# Patient Record
Sex: FEMALE | Race: WHITE | NOT HISPANIC OR LATINO
[De-identification: names, ages, dates, MRNs, and addresses within clinical notes are randomized per-mention and may not be internally consistent; named-entity substitution may affect disease eponyms.]

---

## 2018-06-06 ENCOUNTER — APPOINTMENT (OUTPATIENT)
Dept: THORACIC SURGERY | Facility: CLINIC | Age: 78
End: 2018-06-06
Payer: MEDICARE

## 2018-06-06 VITALS
SYSTOLIC BLOOD PRESSURE: 154 MMHG | HEIGHT: 63 IN | WEIGHT: 145 LBS | HEART RATE: 66 BPM | BODY MASS INDEX: 25.69 KG/M2 | RESPIRATION RATE: 16 BRPM | DIASTOLIC BLOOD PRESSURE: 74 MMHG | OXYGEN SATURATION: 99 %

## 2018-06-06 DIAGNOSIS — R91.1 SOLITARY PULMONARY NODULE: ICD-10-CM

## 2018-06-06 PROCEDURE — 99205 OFFICE O/P NEW HI 60 MIN: CPT

## 2019-06-27 ENCOUNTER — APPOINTMENT (OUTPATIENT)
Dept: VASCULAR SURGERY | Facility: CLINIC | Age: 79
End: 2019-06-27
Payer: MEDICARE

## 2019-06-27 PROCEDURE — 93970 EXTREMITY STUDY: CPT

## 2019-06-27 PROCEDURE — 99203 OFFICE O/P NEW LOW 30 MIN: CPT

## 2019-06-27 RX ORDER — MV-MN/OM3/DHA/EPA/FISH/LUT/ZEA 250-5-1 MG
CAPSULE ORAL
Refills: 0 | Status: COMPLETED | COMMUNITY
End: 2019-06-27

## 2019-06-27 NOTE — PHYSICAL EXAM
[JVD] : no jugular venous distention  [Normal Breath Sounds] : Normal breath sounds [Normal Rate and Rhythm] : normal rate and rhythm [2+] : left 2+ [Varicose Veins Of The Left Leg] : of the left leg [Varicose Veins Of Lower Extremities] : present [Ankle Swelling On The Left] : moderate [Abdomen Tenderness] : ~T ~M No abdominal tenderness [Skin Ulcer] : no ulcer [No Rash or Lesion] : No rash or lesion [Calm] : calm [Alert] : alert [de-identified] : appears well [de-identified] : sensorimotor intact

## 2019-06-27 NOTE — ASSESSMENT
[FreeTextEntry1] : Patient underwent a duplex study which shows femoral vein reflux on the left side with varicose veins. No evidence of DVT bilaterally. At this time no intervention is necessary. Patient to followup to me as needed

## 2019-06-27 NOTE — HISTORY OF PRESENT ILLNESS
[FreeTextEntry1] : 78-year-old female history of left greater saphenous vein stripping presents to the office with increasing varicose veins. Veins are not painful at this time. No history of DVT. She is here for evaluation.

## 2021-08-16 ENCOUNTER — APPOINTMENT (OUTPATIENT)
Dept: SURGERY | Facility: CLINIC | Age: 81
End: 2021-08-16
Payer: MEDICARE

## 2021-08-16 PROCEDURE — 99205K: CUSTOM

## 2024-05-16 ENCOUNTER — EMERGENCY (EMERGENCY)
Facility: HOSPITAL | Age: 84
LOS: 1 days | Discharge: HOME | End: 2024-05-16
Attending: STUDENT IN AN ORGANIZED HEALTH CARE EDUCATION/TRAINING PROGRAM | Admitting: STUDENT IN AN ORGANIZED HEALTH CARE EDUCATION/TRAINING PROGRAM
Payer: MEDICARE

## 2024-05-16 VITALS
HEART RATE: 88 BPM | DIASTOLIC BLOOD PRESSURE: 92 MMHG | SYSTOLIC BLOOD PRESSURE: 172 MMHG | TEMPERATURE: 98 F | RESPIRATION RATE: 15 BRPM | OXYGEN SATURATION: 100 %

## 2024-05-16 PROCEDURE — 99284 EMERGENCY DEPT VISIT MOD MDM: CPT

## 2024-05-16 PROCEDURE — 72170 X-RAY EXAM OF PELVIS: CPT | Mod: 26

## 2024-05-16 PROCEDURE — 71045 X-RAY EXAM CHEST 1 VIEW: CPT | Mod: 26

## 2024-05-16 NOTE — ED ADULT NURSE NOTE - NSFALLRISKINTERV_ED_ALL_ED

## 2024-05-16 NOTE — ED PROVIDER NOTE - PHYSICAL EXAMINATION
GENERAL: well appearing in no acute distress, non-toxic appearing  HEAD: normocephalic, atraumatic, mild occipital tenderness to palpation no laceration or palpable hematoma  HEENT: normal conjunctiva, oral mucosa moist  CARDIAC: regular rate and rhythm, normal S1S2, no appreciable murmurs, 2+ pulses in UE/LE b/l  PULM: normal breath sounds, clear to ascultation bilaterally, no rales, rhonchi, wheezing  GI: abdomen nondistended, soft, nontender, no guarding, rebound tenderness  : no suprapubic tenderness  NEURO: no gross motor or sensory deficits, CN2-12 grossly intact, normal speech, PER (cataract surg hx), EOMI, normal gait, AAOx3, normal gait with cane  MSK: no midline spinal tenderness, no hip tenderness b/l, no joint tenderness in UE or LE b/l, knee FROM b/l, no peripheral edema, no calf tenderness b/l  SKIN: well-perfused, extremities warm, no visible rashes  PSYCH: appropriate mood and affect

## 2024-05-16 NOTE — ED PROVIDER NOTE - CLINICAL SUMMARY MEDICAL DECISION MAKING FREE TEXT BOX
83-year-old female presents for witnessed mechanical fall with out loss of consciousness and no anticoagulation use.  Exam shows tenderness to palpation of occipital head without palpable hematoma, ANO x 4, no focal neurologic deficits, normal gait with cane, lower extremity full range of motion bilaterally, no hip tenderness, no midline spinal tenderness. Will obtain CTH, cxr, pelvis xray. Dispo pending results likely home

## 2024-05-16 NOTE — ED PROVIDER NOTE - NSFOLLOWUPINSTRUCTIONS_ED_ALL_ED_FT
See attached information on fall precautions.      You can take 650 mg of Tylenol up to 4 times a day for any pain.      Return to the emergency room for any new or worsening symptoms.      Follow-up with your primary care doctor in 7 to 10 days.      Fall Prevention for Older Adults    WHAT YOU NEED TO KNOW:    What is fall prevention? Fall prevention includes ways to make your home and other areas safer. Prevention also includes ways you can move more carefully to prevent a fall.    What increases my risk for falls? As you age, your muscles weaken and your risk for falls increases. Your risk also increases if you take medicines that make you sleepy or dizzy. You may also be at risk if you have vision or joint problems, have low blood pressure, or are not active.    How can I help protect myself from falls? Falls are a common cause of injury for older adults. Do the following to help protect yourself:    Stay active. Exercise can help strengthen your muscles and improve your balance. Your healthcare provider may recommend water aerobics, walking, or Daryl Chi. He or she may also recommend physical therapy to improve your coordination. Never start an exercise program without asking your healthcare provider first.  Water Aerobics for Seniors  Daryl Chi for Seniors      Wear shoes that fit well and have soles that . Wear shoes both inside and outside. Use slippers with good . Avoid shoes with high heels.    Use assistive devices as directed. Your healthcare provider may suggest that you use a cane or walker to help you keep your balance. You may need to have grab bars put in your bathroom near the toilet or in the shower.    Stand or sit up slowly. This may help you keep your balance and prevent falls.    Manage your medical conditions. Keep all appointments with your healthcare providers. Visit your eye doctor as directed.  How can I make my home safer?  Fall Prevention for Seniors    Add items to prevent falls in the bathroom. Put nonslip strips on your bath or shower floor to prevent you from slipping. Use a bath mat if you do not have carpet in the bathroom. This will prevent you from falling when you step out of the bath or shower. Use a shower seat so you do not need to stand while you shower. Sit on the toilet or a chair in your bathroom to dry yourself and put on clothing. This will prevent you from losing your balance from drying or dressing yourself while you are standing.    Keep paths clear. Remove books, shoes, and other objects from walkways and stairs. Place cords for telephones and lamps out of the way so that you do not need to walk over them. Tape them down if you cannot move them. Remove small rugs. If you cannot remove a rug, secure it with double-sided tape. This will prevent you from tripping.    Install bright lights in your home. Use night lights to help light paths to the bathroom or kitchen. Always turn on the light before you start walking.    Keep items you use often on shelves within reach. Do not use a step stool to help you reach an item.    Paint or place reflective tape on the edges of your stairs. This will help you see the stairs better.  How do I plan ahead in case I do fall? Talk with family members, friends, and neighbors to create a fall plan. Someone will need to call for emergency help if you are injured or found unconscious. If possible, keep a mobile phone with you at all times, or wear an emergency alert device. You can contact emergency services by pressing a button on the device. Ask your healthcare provider for more information.    Arrange to have someone call your local emergency number (911 in the [place]) if:    You have fallen and are found unconscious.    You have fallen and cannot move part of your body.  When should I call my doctor?    You have fallen and have pain or a headache.    You have questions or concerns about your condition or care.  CARE AGREEMENT:    You have the right to help plan your care. Learn about your health condition and how it may be treated. Discuss treatment options with your healthcare providers to decide what care you want to receive. You always have the right to refuse treatment.

## 2024-05-16 NOTE — ED ADULT TRIAGE NOTE - CHIEF COMPLAINT QUOTE
presents S/P fall hit back of head. ambulates with cane. felt knees buckle and felt backwards. carlee LOC No complaints of chest pain, headache, nausea, dizziness, vomiting  SOB, fever, chills verbalized. phx Lung CA.  last chemo 2 years.

## 2024-05-16 NOTE — ED ADULT NURSE NOTE - OBJECTIVE STATEMENT
Received pt in room 26, Pt is A&Ox4 with past medical history of hypothyroid, Breast CA, Lung CA, pt on oral Chemo. Pt came to the ED due to a fall. Pt reports that after dinner her family and her were outside and pt just fell and landed on her butt and then hit the back of her head on the step of the restaurant. Received pt in room 26, Pt is A&Ox4 with past medical history of hypothyroid, Breast CA, Lung CA, pt on oral Chemo. Pt came to the ED due to a fall. Pt reports that after dinner her family and her were outside and pt just fell and landed on her butt and then hit the back of her head on the step of the restaurant. Pt denies any LOC or vision changes. Pt is ambulatory with her cane, which she uses at baseline. Pt has a bump to the top back of her head. Pt breathing is equal and nonlabored. Pt abdomen is soft and nondistended. Pt denies any chest pain, SOB, headache, nausea, vomiting, diarrhea, fever or chills. Pt safety maintained.

## 2024-05-16 NOTE — ED PROVIDER NOTE - PROGRESS NOTE DETAILS
Normal findings on CT or x-ray imaging.  Patient ambulating at baseline.  Will give return precautions and follow-up instructions with teach back.  Plan for DC. Farhan Peterson, ED Attending

## 2024-05-16 NOTE — ED PROVIDER NOTE - ATTENDING CONTRIBUTION TO CARE
I have personally performed a face to face medical and diagnostic evaluation of the patient. I have discussed with and reviewed the Resident's note and agree with the History, ROS, Physical Exam and MDM unless otherwise indicated. A brief summary of my personal evaluation and impression can be found below.    83-year-old female, presenting with chief complaint of pain in the back of the head after mechanical fall.  States that she was at the end of the staircase, lost her balance with a cane, fell onto her bottom and then hit the back of her head on the staircase.  No LOC.  Not on any anticoagulation.  Has been able to ambulate with a cane since.  No other complaints at this time.  On exam, small hematoma noted to the back of the head, no lacerations.  Neuro intact throughout.  Rest of traumatic exam without any focal findings.  Plan for CT of the head and screening x-rays of the chest and pelvis.  If no actionable findings, plan for DC with return precautions and follow-up instructions with teach back. Farhan Peterson, ED Attending

## 2024-05-16 NOTE — ED PROVIDER NOTE - OBJECTIVE STATEMENT
83-year-old female with PMH of lung cancer status post surgery now in remission since 2018, bilateral breast cancer status post bilateral mastectomy and radiation in 2021 now on anastrozole, hypothyroidism on Synthroid presents with mechanical fall with head strike.  Patient was at a graduation dinner with her family and went down the stairs and then felt her knees buckle and fell backwards and hit the back of her head on the last stair and landed on her bottom.  Patient did not lose consciousness and denies anticoagulation use.  Patient denies lightheadedness or chest pain prior to the fall.  Patient was not able to get up right away but since then has been able to ambulate with her cane which is her baseline.  Denies any pain or symptoms at this time.  Denies recent fevers, chills, cough, congestion, rhinorrhea, chest pain, shortness of breath, abdominal pain, nausea, vomiting, constipation, diarrhea.  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  Patient has a history of left hip surgery as well.

## 2024-05-16 NOTE — ED PROVIDER NOTE - PATIENT PORTAL LINK FT
You can access the FollowMyHealth Patient Portal offered by [facility] by registering at the following website: [email or web address]. By joining [employer]’s FollowMyHealth portal, you will also be able to view your health information using other applications (apps) compatible with our system.

## 2024-05-17 VITALS
RESPIRATION RATE: 16 BRPM | DIASTOLIC BLOOD PRESSURE: 88 MMHG | TEMPERATURE: 99 F | OXYGEN SATURATION: 98 % | HEART RATE: 94 BPM | SYSTOLIC BLOOD PRESSURE: 127 MMHG

## 2024-05-17 PROCEDURE — 70450 CT HEAD/BRAIN W/O DYE: CPT | Mod: 26,MC

## 2024-05-17 NOTE — ED ADULT NURSE REASSESSMENT NOTE - NS ED NURSE REASSESS COMMENT FT1
Break covering RN: patient received, appears to be resting comfortably in bed, no complaints noted at this time. Breathing even and unlabored, pallor/diaphoresis not noted. will continue to monitor.

## 2025-04-30 ENCOUNTER — NON-APPOINTMENT (OUTPATIENT)
Age: 85
End: 2025-04-30

## 2025-04-30 ENCOUNTER — APPOINTMENT (OUTPATIENT)
Age: 85
End: 2025-04-30
Payer: MEDICARE

## 2025-04-30 PROCEDURE — 92014 COMPRE OPH EXAM EST PT 1/>: CPT

## 2025-04-30 PROCEDURE — 92134 CPTRZ OPH DX IMG PST SGM RTA: CPT

## 2025-04-30 PROCEDURE — 92202 OPSCPY EXTND ON/MAC DRAW: CPT
